# Patient Record
Sex: FEMALE | Race: WHITE | NOT HISPANIC OR LATINO | Employment: UNEMPLOYED | URBAN - METROPOLITAN AREA
[De-identification: names, ages, dates, MRNs, and addresses within clinical notes are randomized per-mention and may not be internally consistent; named-entity substitution may affect disease eponyms.]

---

## 2021-07-13 ENCOUNTER — HISTORICAL (OUTPATIENT)
Dept: ADMINISTRATIVE | Facility: HOSPITAL | Age: 56
End: 2021-07-13

## 2021-07-13 LAB
ABS NEUT (OLG): 3.6 X10(3)/MCL (ref 2.1–9.2)
ALBUMIN SERPL-MCNC: 4.4 GM/DL (ref 3.5–5)
ALBUMIN/GLOB SERPL: 1.3 RATIO (ref 1.1–2)
ALP SERPL-CCNC: 104 UNIT/L (ref 40–150)
ALT SERPL-CCNC: 17 UNIT/L (ref 0–55)
AST SERPL-CCNC: 26 UNIT/L (ref 5–34)
BASOPHILS # BLD AUTO: 0 X10(3)/MCL (ref 0–0.2)
BASOPHILS NFR BLD AUTO: 0 %
BILIRUB SERPL-MCNC: 0.5 MG/DL
BILIRUBIN DIRECT+TOT PNL SERPL-MCNC: 0.2 MG/DL (ref 0–0.5)
BILIRUBIN DIRECT+TOT PNL SERPL-MCNC: 0.3 MG/DL (ref 0–0.8)
BUN SERPL-MCNC: 21.2 MG/DL (ref 9.8–20.1)
CALCIUM SERPL-MCNC: 9.9 MG/DL (ref 8.4–10.2)
CHLORIDE SERPL-SCNC: 101 MMOL/L (ref 98–107)
CO2 SERPL-SCNC: 31 MMOL/L (ref 22–29)
CREAT SERPL-MCNC: 0.72 MG/DL (ref 0.55–1.02)
CRP SERPL-MCNC: <0.4 MG/DL
EOSINOPHIL # BLD AUTO: 0.1 X10(3)/MCL (ref 0–0.9)
EOSINOPHIL NFR BLD AUTO: 2 %
ERYTHROCYTE [DISTWIDTH] IN BLOOD BY AUTOMATED COUNT: 13.9 % (ref 11.5–14.5)
ERYTHROCYTE [SEDIMENTATION RATE] IN BLOOD: 98 MM/HR (ref 0–20)
GLOBULIN SER-MCNC: 3.5 GM/DL (ref 2.4–3.5)
GLUCOSE SERPL-MCNC: 83 MG/DL (ref 74–100)
HCT VFR BLD AUTO: 40.4 % (ref 35–46)
HGB BLD-MCNC: 13.1 GM/DL (ref 12–16)
IMM GRANULOCYTES # BLD AUTO: 0.02 10*3/UL
IMM GRANULOCYTES NFR BLD AUTO: 0 %
LYMPHOCYTES # BLD AUTO: 1.6 X10(3)/MCL (ref 0.6–4.6)
LYMPHOCYTES NFR BLD AUTO: 27 %
MCH RBC QN AUTO: 29.7 PG (ref 26–34)
MCHC RBC AUTO-ENTMCNC: 32.4 GM/DL (ref 31–37)
MCV RBC AUTO: 91.6 FL (ref 80–100)
MONOCYTES # BLD AUTO: 0.5 X10(3)/MCL (ref 0.1–1.3)
MONOCYTES NFR BLD AUTO: 9 %
NEUTROPHILS # BLD AUTO: 3.6 X10(3)/MCL (ref 2.1–9.2)
NEUTROPHILS NFR BLD AUTO: 61 %
NRBC BLD AUTO-RTO: 0 % (ref 0–0.2)
PLATELET # BLD AUTO: 330 X10(3)/MCL (ref 130–400)
PMV BLD AUTO: 8.8 FL (ref 7.4–10.4)
POTASSIUM SERPL-SCNC: 4.3 MMOL/L (ref 3.5–5.1)
PROT SERPL-MCNC: 7.9 GM/DL (ref 6.4–8.3)
RBC # BLD AUTO: 4.41 X10(6)/MCL (ref 4–5.2)
SODIUM SERPL-SCNC: 138 MMOL/L (ref 136–145)
URATE SERPL-MCNC: 4 MG/DL (ref 2.6–6)
WBC # SPEC AUTO: 5.9 X10(3)/MCL (ref 4.5–11)

## 2021-10-20 ENCOUNTER — HISTORICAL (OUTPATIENT)
Dept: ADMINISTRATIVE | Facility: HOSPITAL | Age: 56
End: 2021-10-20

## 2021-10-20 LAB
CRP SERPL-MCNC: <0.4 MG/DL
ERYTHROCYTE [SEDIMENTATION RATE] IN BLOOD: 16 MM/HR (ref 0–20)

## 2021-12-10 ENCOUNTER — TELEPHONE (OUTPATIENT)
Dept: PHYSICAL MEDICINE AND REHAB | Facility: CLINIC | Age: 56
End: 2021-12-10
Payer: COMMERCIAL

## 2021-12-13 ENCOUNTER — TELEPHONE (OUTPATIENT)
Dept: PHYSICAL MEDICINE AND REHAB | Facility: CLINIC | Age: 56
End: 2021-12-13
Payer: COMMERCIAL

## 2021-12-13 ENCOUNTER — PATIENT MESSAGE (OUTPATIENT)
Dept: PHYSICAL MEDICINE AND REHAB | Facility: CLINIC | Age: 56
End: 2021-12-13
Payer: COMMERCIAL

## 2022-01-14 ENCOUNTER — OFFICE VISIT (OUTPATIENT)
Dept: PHYSICAL MEDICINE AND REHAB | Facility: CLINIC | Age: 57
End: 2022-01-14
Payer: COMMERCIAL

## 2022-01-14 VITALS — WEIGHT: 150 LBS | HEIGHT: 64 IN | BODY MASS INDEX: 25.61 KG/M2

## 2022-01-14 DIAGNOSIS — M79.18 RIGHT BUTTOCK PAIN: ICD-10-CM

## 2022-01-14 DIAGNOSIS — G89.29 CHRONIC RIGHT-SIDED LOW BACK PAIN WITH RIGHT-SIDED SCIATICA: Primary | ICD-10-CM

## 2022-01-14 DIAGNOSIS — M43.07 SPONDYLOLYSIS OF LUMBOSACRAL REGION: ICD-10-CM

## 2022-01-14 DIAGNOSIS — M43.16 ANTEROLISTHESIS OF LUMBAR SPINE: ICD-10-CM

## 2022-01-14 DIAGNOSIS — M54.17 LUMBOSACRAL RADICULOPATHY AT S1: ICD-10-CM

## 2022-01-14 DIAGNOSIS — M76.891 HAMSTRING TENDONITIS OF RIGHT THIGH: ICD-10-CM

## 2022-01-14 DIAGNOSIS — M54.41 CHRONIC RIGHT-SIDED LOW BACK PAIN WITH RIGHT-SIDED SCIATICA: Primary | ICD-10-CM

## 2022-01-14 PROCEDURE — 3008F PR BODY MASS INDEX (BMI) DOCUMENTED: ICD-10-PCS | Mod: CPTII,S$GLB,, | Performed by: PHYSICAL MEDICINE & REHABILITATION

## 2022-01-14 PROCEDURE — 99999 PR PBB SHADOW E&M-EST. PATIENT-LVL II: ICD-10-PCS | Mod: PBBFAC,,, | Performed by: PHYSICAL MEDICINE & REHABILITATION

## 2022-01-14 PROCEDURE — 1160F PR REVIEW ALL MEDS BY PRESCRIBER/CLIN PHARMACIST DOCUMENTED: ICD-10-PCS | Mod: CPTII,S$GLB,, | Performed by: PHYSICAL MEDICINE & REHABILITATION

## 2022-01-14 PROCEDURE — 99204 OFFICE O/P NEW MOD 45 MIN: CPT | Mod: S$GLB,,, | Performed by: PHYSICAL MEDICINE & REHABILITATION

## 2022-01-14 PROCEDURE — 99999 PR PBB SHADOW E&M-EST. PATIENT-LVL II: CPT | Mod: PBBFAC,,, | Performed by: PHYSICAL MEDICINE & REHABILITATION

## 2022-01-14 PROCEDURE — 99204 PR OFFICE/OUTPT VISIT, NEW, LEVL IV, 45-59 MIN: ICD-10-PCS | Mod: S$GLB,,, | Performed by: PHYSICAL MEDICINE & REHABILITATION

## 2022-01-14 PROCEDURE — 1160F RVW MEDS BY RX/DR IN RCRD: CPT | Mod: CPTII,S$GLB,, | Performed by: PHYSICAL MEDICINE & REHABILITATION

## 2022-01-14 PROCEDURE — 1159F PR MEDICATION LIST DOCUMENTED IN MEDICAL RECORD: ICD-10-PCS | Mod: CPTII,S$GLB,, | Performed by: PHYSICAL MEDICINE & REHABILITATION

## 2022-01-14 PROCEDURE — 1159F MED LIST DOCD IN RCRD: CPT | Mod: CPTII,S$GLB,, | Performed by: PHYSICAL MEDICINE & REHABILITATION

## 2022-01-14 PROCEDURE — 3008F BODY MASS INDEX DOCD: CPT | Mod: CPTII,S$GLB,, | Performed by: PHYSICAL MEDICINE & REHABILITATION

## 2022-01-14 RX ORDER — CELECOXIB 200 MG/1
200 CAPSULE ORAL
COMMUNITY
Start: 2021-07-13 | End: 2022-05-26

## 2022-01-14 RX ORDER — IPRATROPIUM BROMIDE 42 UG/1
SPRAY, METERED NASAL
COMMUNITY
Start: 2021-12-06

## 2022-01-14 RX ORDER — GLUCOSAMINE SULFATE 500 MG
1000 TABLET ORAL
COMMUNITY
Start: 2021-07-13

## 2022-01-14 RX ORDER — HYDROCORTISONE 25 MG/G
CREAM TOPICAL 2 TIMES DAILY
COMMUNITY
Start: 2021-12-23

## 2022-01-14 RX ORDER — AZELASTINE HCL 205.5 UG/1
1 SPRAY NASAL DAILY
COMMUNITY
Start: 2021-10-21

## 2022-01-14 RX ORDER — ASPIRIN 81 MG/1
81 TABLET ORAL
COMMUNITY
Start: 2021-07-13

## 2022-01-14 RX ORDER — METHYLPREDNISOLONE 4 MG/1
TABLET ORAL
COMMUNITY
Start: 2022-01-05 | End: 2022-05-26

## 2022-01-14 RX ORDER — NABUMETONE 500 MG/1
500 TABLET, FILM COATED ORAL
COMMUNITY
Start: 2021-09-16 | End: 2022-05-26

## 2022-01-14 RX ORDER — VIT C/E/ZN/COPPR/LUTEIN/ZEAXAN 250MG-90MG
CAPSULE ORAL
COMMUNITY
Start: 2021-07-13

## 2022-01-14 RX ORDER — ME-TETRAHYDROFOLATE/B12/HRB236 1-1-500 MG
1 CAPSULE ORAL 2 TIMES DAILY
COMMUNITY
Start: 2021-12-27

## 2022-01-14 RX ORDER — IVERMECTIN 3 MG/1
15 TABLET ORAL DAILY
COMMUNITY
Start: 2022-01-03 | End: 2022-05-26

## 2022-01-14 RX ORDER — SUMATRIPTAN SUCCINATE 100 MG/1
100 TABLET ORAL
COMMUNITY
Start: 2021-07-13

## 2022-01-14 RX ORDER — FLUTICASONE PROPIONATE 93 UG/1
93 SPRAY, METERED NASAL
COMMUNITY
Start: 2021-07-13

## 2022-01-17 ENCOUNTER — PATIENT MESSAGE (OUTPATIENT)
Dept: PHYSICAL MEDICINE AND REHAB | Facility: CLINIC | Age: 57
End: 2022-01-17
Payer: COMMERCIAL

## 2022-01-19 NOTE — PROGRESS NOTES
Today's Date: 01/19/2022     Referring Provider: Dr. Abiel Jackson *     Chief Complaint:   Chief Complaint   Patient presents with    Back Pain     PRP consult for back pain / neck pain       HPI: Chacha Diallo is a 56 y.o. female  who presents today for   Evaluation of right buttock pain and right lower extremity pain.   She states that her symptoms started approximately  1 year history of right low back pain right buttock pain.  She states that her pain started in December of 2020 when she was hospitalized with COVID. The pain starts in her buttock radiates down the posterior thigh to the outside of the foot.  She describes it as a tightness burning pain.  She also complains of pain in the medial thigh.  The pain generally stops at the knee, however when it is severe can go down to the foot.  Pain is worse with sleeping and prolonged sitting.  Pain is better with sleeping on her back and standing.  She tried gabapentin with no relief.  She had an S1 transforaminal epidural steroid injection which unfortunately did not provide her with any improvement in pain or function.      Review of Systems   Constitutional: Negative for chills and fever.   HENT: Negative for congestion and tinnitus.    Eyes: Negative for blurred vision and photophobia.   Respiratory: Negative for shortness of breath and wheezing.    Cardiovascular: Negative for chest pain and palpitations.   Gastrointestinal: Negative for nausea and vomiting.   Genitourinary: Negative for dysuria and frequency.   Musculoskeletal: Positive for back pain and myalgias. Negative for joint pain.   Skin: Negative for itching and rash.   Neurological: Positive for tingling and sensory change. Negative for speech change and focal weakness.   Endo/Heme/Allergies: Negative for environmental allergies. Does not bruise/bleed easily.   Psychiatric/Behavioral: Negative for depression. The patient is not nervous/anxious.         Social History     Socioeconomic History     Marital status: Single   Tobacco Use    Smoking status: Never Smoker    Smokeless tobacco: Never Used       No family history on file.    Current Outpatient Medications on File Prior to Visit   Medication Sig Dispense Refill    aspirin (ECOTRIN) 81 MG EC tablet Take 81 mg by mouth.      celecoxib (CELEBREX) 200 MG capsule Take 200 mg by mouth.      fluticasone propionate (XHANCE) 93 mcg/actuation AerB 93 mcg by Nasal route.      ipratropium (ATROVENT) 42 mcg (0.06 %) nasal spray by Nasal route.      lysine 1,000 mg Tab Take 1,000 mg by mouth.      nabumetone (RELAFEN) 500 MG tablet Take 500 mg by mouth.      sumatriptan (IMITREX) 100 MG tablet Take 100 mg by mouth.      vit C,F-Bb-zcfca-lutein-zeaxan (PRESERVISION AREDS-2) 250-90-40-1 mg Cap   0 Refill(s)      azelastine 205.5 mcg (0.15 %) Spry 1 spray by Each Nostril route once daily.      hydrocortisone 2.5 % cream Apply topically 2 (two) times daily.      ivermectin (STROMECTOL) 3 mg Tab Take 15 mg by mouth once daily.      methylPREDNISolone (MEDROL DOSEPACK) 4 mg tablet Take by mouth.      RHEUMATE 1-1-500 mg Cap Take 1 capsule by mouth 2 (two) times daily.       No current facility-administered medications on file prior to visit.        Review of patient's allergies indicates:   Allergen Reactions    Sulfa (sulfonamide antibiotics)        No past surgical history on file.    No past medical history on file.    There were no vitals filed for this visit.  Physical Exam  Constitutional:       General: She is not in acute distress.     Appearance: Normal appearance.   HENT:      Head: Normocephalic and atraumatic.      Nose: Nose normal. No congestion.      Mouth/Throat:      Mouth: Mucous membranes are moist.      Pharynx: Oropharynx is clear.   Eyes:      Extraocular Movements: Extraocular movements intact.      Pupils: Pupils are equal, round, and reactive to light.   Neck:      Trachea: Trachea and phonation normal.   Pulmonary:       Effort: Pulmonary effort is normal. No respiratory distress.   Abdominal:      General: Abdomen is flat. There is no distension.   Musculoskeletal:      Lumbar back: Tenderness (Right piriformis/  ischial tuberosity) and bony tenderness present. Negative right straight leg raise test and negative left straight leg raise test.   Skin:     General: Skin is warm and dry.   Neurological:      General: No focal deficit present.      Mental Status: She is alert and oriented to person, place, and time.      Cranial Nerves: Cranial nerves are intact.      Sensory: Sensation is intact.      Motor: Motor function is intact.      Gait: Gait is intact.   Psychiatric:         Mood and Affect: Mood and affect normal.         Behavior: Behavior normal.        Right Hip Exam     Tenderness   The patient is experiencing tenderness in the ischial tuberosity and posterior.    Tests   KELSI: negative  Stacey: negative      Back Exam     Tests   Straight leg raise right: negative  Straight leg raise left: negative               MRI of the lumbar spine from April of 2021 shows     L2-3:  1.5 mm left lateral disc bulge.  Minor left neural foraminal narrowing.  Early facet arthropathy     L3-4:  4.4 mm left paracentral disc protrusion to an annular fissure which effaces the ventral sac.  Facet  Hypertrophy.  Minor central stenosis no neural foraminal stenosis potential left L4 impingement within the subarticular zone     L4-5:  Grade 1 anterolisthesis.  Annular fissure which contacts the ventral thecal sac.  Severe facet arthropathy.  Mental central stenosis.  Minor bilateral neural foraminal narrowing      L5-S1: Bilateral L5 spondylolysis with grade 1 spondylolisthesis.  Facet hypertrophy.  Moderate bilateral neural foraminal narrowing.  Minor impingement on the left L5 nerve root.    Chacha was seen today for back pain.    Diagnoses and all orders for this visit:    Chronic right-sided low back pain with right-sided sciatica    Right  buttock pain    Hamstring tendonitis of right thigh    Lumbosacral radiculopathy at S1    Anterolisthesis of lumbar spine    Spondylolysis of lumbosacral region          PLAN:   Chacha Diallo is a 56 y.o. female  With   Right buttock pain and pain in the posterior thigh.  She does have grade 1 anterolisthesis of L4-5 and L5-S1.  There is severe facet arthropathy at those levels.  There does not appear to be any significant nerve root impingement,  But there is moderate neural foraminal narrowing at L5-S1. She did have a S1 transforaminal epidural steroid injection which did not provide her with any meaningful improvement in pain or function.   I do not have the MRI report at this time.  I will request an MRI report along with  This CD to  Personally review.  I suspect that she may have hamstring origin tendinosis as she does have buttock pain radiating into the posterior medial and lateral thigh generally stopping at about the knee, but I cannot rule out radiculopathy.  Depending on what the MRI shows, I may order an MRI of the right hip.      Sudarshan Landeros D.O.  Physical Medicine and Rehabilitation          CC: Patients PCP: Primary Doctor No  CC: Referring Provider: Dr. Abiel Jackson *

## 2022-01-28 ENCOUNTER — PATIENT MESSAGE (OUTPATIENT)
Dept: PHYSICAL MEDICINE AND REHAB | Facility: CLINIC | Age: 57
End: 2022-01-28
Payer: COMMERCIAL

## 2022-01-28 DIAGNOSIS — M76.899 HAMSTRING TENDINITIS AT ORIGIN: Primary | ICD-10-CM

## 2022-01-28 DIAGNOSIS — M25.559 PAIN IN UNSPECIFIED HIP: ICD-10-CM

## 2022-02-16 ENCOUNTER — PATIENT MESSAGE (OUTPATIENT)
Dept: PHYSICAL MEDICINE AND REHAB | Facility: CLINIC | Age: 57
End: 2022-02-16
Payer: COMMERCIAL

## 2022-02-22 ENCOUNTER — PATIENT MESSAGE (OUTPATIENT)
Dept: PHYSICAL MEDICINE AND REHAB | Facility: CLINIC | Age: 57
End: 2022-02-22
Payer: COMMERCIAL

## 2022-03-10 ENCOUNTER — OFFICE VISIT (OUTPATIENT)
Dept: PHYSICAL MEDICINE AND REHAB | Facility: CLINIC | Age: 57
End: 2022-03-10
Payer: COMMERCIAL

## 2022-03-10 DIAGNOSIS — M76.899 HAMSTRING TENDONITIS AT ORIGIN: Primary | ICD-10-CM

## 2022-03-10 PROCEDURE — 99499 NO LOS: ICD-10-PCS | Mod: 95,,, | Performed by: PHYSICAL MEDICINE & REHABILITATION

## 2022-03-10 PROCEDURE — 99499 UNLISTED E&M SERVICE: CPT | Mod: 95,,, | Performed by: PHYSICAL MEDICINE & REHABILITATION

## 2022-03-11 ENCOUNTER — PATIENT MESSAGE (OUTPATIENT)
Dept: PHYSICAL MEDICINE AND REHAB | Facility: CLINIC | Age: 57
End: 2022-03-11
Payer: COMMERCIAL

## 2022-03-15 ENCOUNTER — PATIENT MESSAGE (OUTPATIENT)
Dept: PHYSICAL MEDICINE AND REHAB | Facility: CLINIC | Age: 57
End: 2022-03-15
Payer: COMMERCIAL

## 2022-04-07 ENCOUNTER — HISTORICAL (OUTPATIENT)
Dept: ADMINISTRATIVE | Facility: HOSPITAL | Age: 57
End: 2022-04-07
Payer: COMMERCIAL

## 2022-04-11 ENCOUNTER — PATIENT MESSAGE (OUTPATIENT)
Dept: PHYSICAL MEDICINE AND REHAB | Facility: CLINIC | Age: 57
End: 2022-04-11
Payer: COMMERCIAL

## 2022-04-13 RX ORDER — HYDROCODONE BITARTRATE AND ACETAMINOPHEN 7.5; 325 MG/1; MG/1
1 TABLET ORAL EVERY 6 HOURS PRN
Qty: 16 TABLET | Refills: 0 | Status: SHIPPED | OUTPATIENT
Start: 2022-04-13 | End: 2022-05-26

## 2022-04-14 ENCOUNTER — OFFICE VISIT (OUTPATIENT)
Dept: PHYSICAL MEDICINE AND REHAB | Facility: CLINIC | Age: 57
End: 2022-04-14
Payer: COMMERCIAL

## 2022-04-14 VITALS — BODY MASS INDEX: 25.61 KG/M2 | RESPIRATION RATE: 18 BRPM | WEIGHT: 150 LBS | HEIGHT: 64 IN

## 2022-04-14 DIAGNOSIS — M76.899 HAMSTRING TENDONITIS AT ORIGIN: ICD-10-CM

## 2022-04-14 DIAGNOSIS — M67.951 TENDINOPATHY OF RIGHT GLUTEUS MEDIUS: Primary | ICD-10-CM

## 2022-04-14 DIAGNOSIS — S73.191A TEAR OF RIGHT ACETABULAR LABRUM, INITIAL ENCOUNTER: ICD-10-CM

## 2022-04-14 PROCEDURE — 3008F BODY MASS INDEX DOCD: CPT | Mod: CPTII,,, | Performed by: PHYSICAL MEDICINE & REHABILITATION

## 2022-04-14 PROCEDURE — 0232T NJX PLATELET PLASMA: CPT | Mod: CSM,S$GLB,, | Performed by: PHYSICAL MEDICINE & REHABILITATION

## 2022-04-14 PROCEDURE — 99999 PR PBB SHADOW E&M-EST. PATIENT-LVL III: ICD-10-PCS | Mod: PBBFAC,,, | Performed by: PHYSICAL MEDICINE & REHABILITATION

## 2022-04-14 PROCEDURE — 0232T PR INJECT PLATELET PLASMA W/IMG HARVEST/PREPARATOIN: ICD-10-PCS | Mod: CSM,S$GLB,, | Performed by: PHYSICAL MEDICINE & REHABILITATION

## 2022-04-14 PROCEDURE — 99499 NO LOS: ICD-10-PCS | Mod: ,,, | Performed by: PHYSICAL MEDICINE & REHABILITATION

## 2022-04-14 PROCEDURE — 99499 UNLISTED E&M SERVICE: CPT | Mod: ,,, | Performed by: PHYSICAL MEDICINE & REHABILITATION

## 2022-04-14 PROCEDURE — 3008F PR BODY MASS INDEX (BMI) DOCUMENTED: ICD-10-PCS | Mod: CPTII,,, | Performed by: PHYSICAL MEDICINE & REHABILITATION

## 2022-04-14 PROCEDURE — 99999 PR PBB SHADOW E&M-EST. PATIENT-LVL III: CPT | Mod: PBBFAC,,, | Performed by: PHYSICAL MEDICINE & REHABILITATION

## 2022-04-14 PROCEDURE — 1159F MED LIST DOCD IN RCRD: CPT | Mod: CPTII,,, | Performed by: PHYSICAL MEDICINE & REHABILITATION

## 2022-04-14 PROCEDURE — 1159F PR MEDICATION LIST DOCUMENTED IN MEDICAL RECORD: ICD-10-PCS | Mod: CPTII,,, | Performed by: PHYSICAL MEDICINE & REHABILITATION

## 2022-04-14 NOTE — PROCEDURES
Procedures     Procedure:  Ultrasound-guided platelet rich plasma injection to the right hamstring origin, right gluteus medius tendon insertion, right anterior superior hip labrum and intra-articular hip    Procedure indications:  Right hamstring origin tendinosis, gluteus medius tendinosis, and hip labral tear    Procedure in detail:  Risks and benefits were discussed and verbal informed consent was obtained.  100 cc of venous blood was withdrawn in a sterile fashion and combined with 16 cc of anticoagulant.  This was then process into 4 cc of leukocyte poor platelet rich plasma using ArthMythos Brodie centrifuge.  The patient was placed next to the [facility] 60 ultrasound.  The patient was placed in the prone position.  Using a curvilinear transducer, the hamstring origin was identified.  Distally, skin was cleaned with ChloraPrep and allowed to dry.  Skin in tract was anesthetized using a 27 gauge 1.5 in needle in approximately 1 cc of 1% lidocaine.  The needle was removed and replaced with a 22 gauge 3.5 in spinal needle which was advanced under direct ultrasound visualization into the hamstring origin tendon.  Once the hamstring origin tendon was reached, 1.5 cc of leukocyte poor platelet rich plasma was injected.  Needle was removed.  The patient was then placed the left side-lying position.  Using a curvilinear transducer, the gluteus medius tendon insertion on the lateral and posterior facets of the greater trochanter were identified.  Dorsally, skin was cleaned with ChloraPrep and allowed to dry.  Skin in tract was anesthetized using a 27 gauge 1.5 in needle in approximately 1 cc of 1% lidocaine.  This needle was removed and replaced with a 22 gauge 3.5 in spinal needle which was advanced under direct ultrasound visualization into the gluteus medius tendon insertion where 1 cc of leukocyte poor platelet rich plasma was injected.  Needle was removed.  The patient was then placed in the supine position and  using a curvilinear transducer, the femoral acetabular joint was identified.  Distally, skin was cleaned with ChloraPrep and allowed to dry.  A 27 gauge 1.5 in needle was used to anesthetize the skin in tract.  This needle was removed and replaced with a 22 gauge 3.5 in spinal needle which was advanced under direct ultrasound visualization carefully avoiding the neurovascular bundle into the superior anterior labrum where 0.5 cc of leukocyte poor platelet rich plasma was injected into the hip labrum.  The needle was then retracted and then advanced inferiorly into the hip joint where the remainder of the leukocyte poor platelet rich plasma was injected intra-articular.  Needle was removed and Band-Aid was applied.  The patient tolerated the procedure with some increasing pain following the procedure.

## 2022-04-24 VITALS
SYSTOLIC BLOOD PRESSURE: 114 MMHG | HEIGHT: 64 IN | OXYGEN SATURATION: 98 % | BODY MASS INDEX: 27.92 KG/M2 | WEIGHT: 163.56 LBS | DIASTOLIC BLOOD PRESSURE: 75 MMHG

## 2022-05-26 ENCOUNTER — PATIENT MESSAGE (OUTPATIENT)
Dept: PHYSICAL MEDICINE AND REHAB | Facility: CLINIC | Age: 57
End: 2022-05-26
Payer: COMMERCIAL

## 2022-05-26 ENCOUNTER — OFFICE VISIT (OUTPATIENT)
Dept: PHYSICAL MEDICINE AND REHAB | Facility: CLINIC | Age: 57
End: 2022-05-26
Payer: COMMERCIAL

## 2022-05-26 DIAGNOSIS — M54.17 LUMBOSACRAL RADICULOPATHY: Primary | ICD-10-CM

## 2022-05-26 PROCEDURE — 99213 OFFICE O/P EST LOW 20 MIN: CPT | Mod: 95,,, | Performed by: PHYSICAL MEDICINE & REHABILITATION

## 2022-05-26 PROCEDURE — 99213 PR OFFICE/OUTPT VISIT, EST, LEVL III, 20-29 MIN: ICD-10-PCS | Mod: 95,,, | Performed by: PHYSICAL MEDICINE & REHABILITATION

## 2022-05-26 NOTE — PROGRESS NOTES
The patient location is: Work  The chief complaint leading to consultation is: Followup    Visit type: audio only    Face to Face time with patient: 15 minutes  25 minutes of total time spent on the encounter, which includes face to face time and non-face to face time preparing to see the patient (eg, review of tests), Obtaining and/or reviewing separately obtained history, Documenting clinical information in the electronic or other health record, Independently interpreting results (not separately reported) and communicating results to the patient/family/caregiver, or Care coordination (not separately reported).         Each patient to whom he or she provides medical services by telemedicine is:  (1) informed of the relationship between the physician and patient and the respective role of any other health care provider with respect to management of the patient; and (2) notified that he or she may decline to receive medical services by telemedicine and may withdraw from such care at any time.    Notes:     Chacha Diallo is a 56 y.o. female who is about 6 weeks status post PRP to the right hamstring origin, gluteus medius/minimus insertion, and hip joint.  She states significant improvement in pain in the right lateral hip.  She continues to complain of intermittent buttock pain on the right, but the intensity has improved.  Lastly, she continues to complain of dysesthesias in the lower extremities on the right greater than left in the calf and foot.  Overall, she states improvement in symptoms, but she continues to complain of symptoms which interfere with her activities of daily living and functional mobility.  I suspect that her lower extremity symptoms are secondary to lumbosacral radiculopathy.  I will schedule her for an EMG/NCS of the lower extremities to assess for specific nerve root impingement.

## 2022-05-26 NOTE — TELEPHONE ENCOUNTER
Spoke with patient on the phone. Rescheduled virtual visit to 11:20am today. Will contact patient if there is trouble connecting on the visit again.

## 2022-06-06 ENCOUNTER — PATIENT MESSAGE (OUTPATIENT)
Dept: PHYSICAL MEDICINE AND REHAB | Facility: CLINIC | Age: 57
End: 2022-06-06
Payer: COMMERCIAL

## 2022-06-08 ENCOUNTER — TELEPHONE (OUTPATIENT)
Dept: PHYSICAL MEDICINE AND REHAB | Facility: CLINIC | Age: 57
End: 2022-06-08
Payer: COMMERCIAL

## 2022-06-08 DIAGNOSIS — M54.16 LUMBAR RADICULOPATHY: Primary | ICD-10-CM

## 2022-06-08 NOTE — TELEPHONE ENCOUNTER
----- Message from Maria Victoria Crawford MA sent at 6/8/2022 11:39 AM CDT -----  Type: Needs Medical Advice  Who Called:  Chacha Warren Call Back Number: [phone]  Additional Information: patient returning call to Sylvie,  she can be reached between 12:30 - 3:00 the after 4:30.  Please return call.  Patient has questions regarding emg

## 2022-06-08 NOTE — TELEPHONE ENCOUNTER
Spoke to pt. She says since Dr. Landeros is leaving and will not be able to continue care after the EMG she is just going to get the EMG done somewhere closer to her. She also asked how to get copies of her records with Dr. Landeros. I provided her with the number to [facility] medical records department.

## 2022-06-09 NOTE — PROGRESS NOTES
The patient location is: Home  The chief complaint leading to consultation is:Followup MRI    Visit type: audiovisual    Face to Face time with patient: 15  30 minutes of total time spent on the encounter, which includes face to face time and non-face to face time preparing to see the patient (eg, review of tests), Obtaining and/or reviewing separately obtained history, Documenting clinical information in the electronic or other health record, Independently interpreting results (not separately reported) and communicating results to the patient/family/caregiver, or Care coordination (not separately reported).         Each patient to whom he or she provides medical services by telemedicine is:  (1) informed of the relationship between the physician and patient and the respective role of any other health care provider with respect to management of the patient; and (2) notified that he or she may decline to receive medical services by telemedicine and may withdraw from such care at any time.    Spoke with patient.  Discussed MRI results.  Recommend platelet rich plasma injection to the right hip joint, gluteus medius/minimus tendon insertion, and hamstring origin.  Patient will be scheduled the next available appointment

## 2022-06-13 ENCOUNTER — PATIENT MESSAGE (OUTPATIENT)
Dept: PHYSICAL MEDICINE AND REHAB | Facility: CLINIC | Age: 57
End: 2022-06-13
Payer: COMMERCIAL

## 2022-06-13 ENCOUNTER — TELEPHONE (OUTPATIENT)
Dept: FAMILY MEDICINE | Facility: CLINIC | Age: 57
End: 2022-06-13
Payer: COMMERCIAL

## 2022-06-13 NOTE — TELEPHONE ENCOUNTER
Called patient regarding EMG referral and patient declined because she lives in [place] now and will find another doctor. XANDER